# Patient Record
Sex: MALE | Race: WHITE | NOT HISPANIC OR LATINO
[De-identification: names, ages, dates, MRNs, and addresses within clinical notes are randomized per-mention and may not be internally consistent; named-entity substitution may affect disease eponyms.]

---

## 2018-06-14 PROBLEM — Z00.00 ENCOUNTER FOR PREVENTIVE HEALTH EXAMINATION: Status: ACTIVE | Noted: 2018-06-14

## 2018-06-15 ENCOUNTER — APPOINTMENT (OUTPATIENT)
Dept: CT IMAGING | Facility: CLINIC | Age: 53
End: 2018-06-15
Payer: COMMERCIAL

## 2018-06-15 ENCOUNTER — OUTPATIENT (OUTPATIENT)
Dept: OUTPATIENT SERVICES | Facility: HOSPITAL | Age: 53
LOS: 1 days | End: 2018-06-15

## 2018-06-15 PROCEDURE — 75571 CT HRT W/O DYE W/CA TEST: CPT | Mod: 26

## 2018-06-20 ENCOUNTER — TRANSCRIPTION ENCOUNTER (OUTPATIENT)
Age: 53
End: 2018-06-20

## 2018-06-29 ENCOUNTER — OUTPATIENT (OUTPATIENT)
Dept: OUTPATIENT SERVICES | Facility: HOSPITAL | Age: 53
LOS: 1 days | End: 2018-06-29

## 2018-06-29 ENCOUNTER — APPOINTMENT (OUTPATIENT)
Dept: ULTRASOUND IMAGING | Facility: CLINIC | Age: 53
End: 2018-06-29
Payer: COMMERCIAL

## 2018-06-29 PROCEDURE — 76775 US EXAM ABDO BACK WALL LIM: CPT | Mod: 26

## 2019-09-20 ENCOUNTER — TRANSCRIPTION ENCOUNTER (OUTPATIENT)
Age: 54
End: 2019-09-20

## 2019-09-25 ENCOUNTER — APPOINTMENT (OUTPATIENT)
Dept: PULMONOLOGY | Facility: CLINIC | Age: 54
End: 2019-09-25

## 2021-10-01 ENCOUNTER — APPOINTMENT (OUTPATIENT)
Dept: UROLOGY | Facility: CLINIC | Age: 56
End: 2021-10-01
Payer: COMMERCIAL

## 2021-10-01 ENCOUNTER — NON-APPOINTMENT (OUTPATIENT)
Age: 56
End: 2021-10-01

## 2021-10-01 VITALS
BODY MASS INDEX: 26.9 KG/M2 | TEMPERATURE: 97.8 F | HEIGHT: 73 IN | WEIGHT: 203 LBS | DIASTOLIC BLOOD PRESSURE: 92 MMHG | HEART RATE: 99 BPM | SYSTOLIC BLOOD PRESSURE: 145 MMHG

## 2021-10-01 DIAGNOSIS — Z87.891 PERSONAL HISTORY OF NICOTINE DEPENDENCE: ICD-10-CM

## 2021-10-01 PROCEDURE — 99204 OFFICE O/P NEW MOD 45 MIN: CPT

## 2021-10-01 RX ORDER — ATORVASTATIN CALCIUM 20 MG/1
20 TABLET, FILM COATED ORAL
Refills: 0 | Status: ACTIVE | COMMUNITY

## 2021-10-01 RX ORDER — OXYBUTYNIN CHLORIDE 10 MG/1
10 TABLET, EXTENDED RELEASE ORAL
Qty: 30 | Refills: 11 | Status: ACTIVE | COMMUNITY
Start: 2021-10-01 | End: 1900-01-01

## 2021-10-01 RX ORDER — OLMESARTAN MEDOXOMIL AND HYDROCHLOROTHIAZIDE 40; 12.5 MG/1; MG/1
40-12.5 TABLET ORAL
Refills: 0 | Status: ACTIVE | COMMUNITY

## 2021-10-01 RX ORDER — VALACYCLOVIR HYDROCHLORIDE 1 G/1
1 TABLET, FILM COATED ORAL
Refills: 0 | Status: ACTIVE | COMMUNITY

## 2021-10-01 RX ORDER — ESCITALOPRAM OXALATE 20 MG/1
20 TABLET ORAL
Refills: 0 | Status: ACTIVE | COMMUNITY

## 2021-10-01 NOTE — HISTORY OF PRESENT ILLNESS
[FreeTextEntry1] : 56M HTN high cholesterol\par 1 year history increasing urinary urgency\par occasional urge incontinence\par no pads\par good FOS\par nocturia x 1\par +frequency\par no hematuria\par occasional double voiding\par +increasing post void dribbling\par +caffeine - 2-3 coffee/tea per day \par alcohol 4 drinks pre day\par no family history prostate kidney bladder cancer\par

## 2021-10-01 NOTE — LETTER BODY
[Dear  ___] : Dear  [unfilled], [FreeTextEntry2] : Jed Walker M.D.\[address]\[place] [id number] [FreeTextEntry1] : I had the pleasure of seeing your patient,  RAFFAELE QUEZADA, a 56 year old man, in the office in consultation today. Please see the attached note for full details.\par \par Thank you very much for allowing me to participate in the care of this patient. If you have any questions please feel free to call me at any time. \par \par \par Sincerely yours,\par \par \par \par Edil De León MD, JAMIL\par Director, Male Fertility and Microsurgery\par  of Urology\par [facility]\par

## 2021-10-01 NOTE — PHYSICAL EXAM
[Urethral Meatus] : meatus normal [Penis Abnormality] : normal circumcised penis [Urinary Bladder Findings] : the bladder was normal on palpation [Scrotum] : the scrotum was normal [Epididymis] : the epididymides were normal [Testes Tenderness] : no tenderness of the testes [Testes Mass (___cm)] : there were no testicular masses [Prostate Tenderness] : the prostate was not tender [No Prostate Nodules] : no prostate nodules [Prostate Size ___ gm] : prostate size [unfilled] gm

## 2021-10-04 ENCOUNTER — NON-APPOINTMENT (OUTPATIENT)
Age: 56
End: 2021-10-04

## 2021-10-04 DIAGNOSIS — R31.29 OTHER MICROSCOPIC HEMATURIA: ICD-10-CM

## 2021-10-04 LAB
APPEARANCE: CLEAR
BACTERIA UR CULT: NORMAL
BACTERIA: NEGATIVE
BILIRUBIN URINE: NEGATIVE
BLOOD URINE: NEGATIVE
COLOR: YELLOW
GLUCOSE QUALITATIVE U: NEGATIVE
HYALINE CASTS: 0 /LPF
KETONES URINE: NORMAL
LEUKOCYTE ESTERASE URINE: NEGATIVE
MICROSCOPIC-UA: NORMAL
NITRITE URINE: NEGATIVE
PH URINE: 6
PROTEIN URINE: ABNORMAL
PSA SERPL-MCNC: 1.27 NG/ML
RED BLOOD CELLS URINE: 3 /HPF
SPECIFIC GRAVITY URINE: 1.03
SQUAMOUS EPITHELIAL CELLS: 0 /HPF
UROBILINOGEN URINE: NORMAL
WHITE BLOOD CELLS URINE: 1 /HPF

## 2021-10-05 ENCOUNTER — NON-APPOINTMENT (OUTPATIENT)
Age: 56
End: 2021-10-05

## 2021-10-22 ENCOUNTER — APPOINTMENT (OUTPATIENT)
Dept: UROLOGY | Facility: CLINIC | Age: 56
End: 2021-10-22
Payer: COMMERCIAL

## 2021-10-22 VITALS — TEMPERATURE: 98 F | DIASTOLIC BLOOD PRESSURE: 79 MMHG | HEART RATE: 66 BPM | SYSTOLIC BLOOD PRESSURE: 133 MMHG

## 2021-10-22 DIAGNOSIS — N32.81 OVERACTIVE BLADDER: ICD-10-CM

## 2021-10-22 PROCEDURE — 76775 US EXAM ABDO BACK WALL LIM: CPT

## 2021-10-22 PROCEDURE — 52000 CYSTOURETHROSCOPY: CPT

## 2021-11-16 ENCOUNTER — APPOINTMENT (OUTPATIENT)
Dept: UROLOGY | Facility: CLINIC | Age: 56
End: 2021-11-16

## 2021-11-22 ENCOUNTER — OUTPATIENT (OUTPATIENT)
Dept: OUTPATIENT SERVICES | Facility: HOSPITAL | Age: 56
LOS: 1 days | End: 2021-11-22
Payer: COMMERCIAL

## 2021-11-22 ENCOUNTER — APPOINTMENT (OUTPATIENT)
Dept: CT IMAGING | Facility: HOSPITAL | Age: 56
End: 2021-11-22
Payer: COMMERCIAL

## 2021-11-22 ENCOUNTER — RESULT REVIEW (OUTPATIENT)
Age: 56
End: 2021-11-22

## 2021-11-22 PROCEDURE — 74178 CT ABD&PLV WO CNTR FLWD CNTR: CPT

## 2021-11-22 PROCEDURE — 74178 CT ABD&PLV WO CNTR FLWD CNTR: CPT | Mod: 26

## 2021-12-01 ENCOUNTER — NON-APPOINTMENT (OUTPATIENT)
Age: 56
End: 2021-12-01

## 2021-12-02 ENCOUNTER — NON-APPOINTMENT (OUTPATIENT)
Age: 56
End: 2021-12-02

## 2021-12-08 ENCOUNTER — NON-APPOINTMENT (OUTPATIENT)
Age: 56
End: 2021-12-08

## 2021-12-09 ENCOUNTER — NON-APPOINTMENT (OUTPATIENT)
Age: 56
End: 2021-12-09

## 2022-11-29 NOTE — ASSESSMENT
[FreeTextEntry1] : LUTS ?BPH more likely OAB\par trial oxybutynin 10\par UA UCX\par PSA\par decrease EtOH and caffeine\par f/u 6 weeks\par  Azathioprine Pregnancy And Lactation Text: This medication is Pregnancy Category D and isn't considered safe during pregnancy. It is unknown if this medication is excreted in breast milk.

## 2024-11-04 NOTE — REASON FOR VISIT
[Thin] : thin [Short For Stated Age] : short for stated age [icteric] : anicteric [Normal Oropharynx] : the oropharynx was normal [CTAB] : lungs clear to auscultation bilaterally [Respiratory Distress] : no respiratory distress  [Soft] : soft  [Distended] : non distended [Initial Visit ___] : [unfilled] is here today for an initial visit  for [unfilled] [Tender] : non tender [Rash] : no rash [Jaundice] : no jaundice

## 2025-01-03 ENCOUNTER — NON-APPOINTMENT (OUTPATIENT)
Age: 60
End: 2025-01-03

## 2025-01-03 ENCOUNTER — APPOINTMENT (OUTPATIENT)
Dept: OPHTHALMOLOGY | Facility: CLINIC | Age: 60
End: 2025-01-03
Payer: COMMERCIAL

## 2025-01-03 PROCEDURE — 92004 COMPRE OPH EXAM NEW PT 1/>: CPT

## 2025-01-03 PROCEDURE — 92136 OPHTHALMIC BIOMETRY: CPT

## 2025-01-03 PROCEDURE — 92133 CPTRZD OPH DX IMG PST SGM ON: CPT

## 2025-01-09 ENCOUNTER — APPOINTMENT (OUTPATIENT)
Dept: OPHTHALMOLOGY | Facility: CLINIC | Age: 60
End: 2025-01-09
Payer: COMMERCIAL

## 2025-01-09 ENCOUNTER — NON-APPOINTMENT (OUTPATIENT)
Age: 60
End: 2025-01-09

## 2025-01-09 PROCEDURE — 92083 EXTENDED VISUAL FIELD XM: CPT

## 2025-01-09 PROCEDURE — 92002 INTRM OPH EXAM NEW PATIENT: CPT

## 2025-02-11 ENCOUNTER — NON-APPOINTMENT (OUTPATIENT)
Age: 60
End: 2025-02-11

## 2025-02-11 ENCOUNTER — APPOINTMENT (OUTPATIENT)
Dept: OPHTHALMOLOGY | Facility: CLINIC | Age: 60
End: 2025-02-11
Payer: COMMERCIAL

## 2025-02-11 PROCEDURE — 92012 INTRM OPH EXAM EST PATIENT: CPT

## 2025-02-11 PROCEDURE — 92134 CPTRZ OPH DX IMG PST SGM RTA: CPT

## 2025-02-20 ENCOUNTER — APPOINTMENT (OUTPATIENT)
Dept: OPHTHALMOLOGY | Facility: CLINIC | Age: 60
End: 2025-02-20
Payer: COMMERCIAL

## 2025-02-20 ENCOUNTER — NON-APPOINTMENT (OUTPATIENT)
Age: 60
End: 2025-02-20

## 2025-02-20 PROCEDURE — 92020 GONIOSCOPY: CPT

## 2025-02-20 PROCEDURE — 92083 EXTENDED VISUAL FIELD XM: CPT

## 2025-02-20 PROCEDURE — 76514 ECHO EXAM OF EYE THICKNESS: CPT

## 2025-02-20 PROCEDURE — 92012 INTRM OPH EXAM EST PATIENT: CPT

## 2025-02-20 PROCEDURE — 92133 CPTRZD OPH DX IMG PST SGM ON: CPT

## 2025-04-29 ENCOUNTER — NON-APPOINTMENT (OUTPATIENT)
Age: 60
End: 2025-04-29

## 2025-04-29 ENCOUNTER — APPOINTMENT (OUTPATIENT)
Dept: OPHTHALMOLOGY | Facility: CLINIC | Age: 60
End: 2025-04-29
Payer: COMMERCIAL

## 2025-04-29 PROCEDURE — 92136 OPHTHALMIC BIOMETRY: CPT

## 2025-04-29 PROCEDURE — 92012 INTRM OPH EXAM EST PATIENT: CPT

## 2025-05-22 NOTE — OPERATIVE REPORT - OPERATIVE RPOSRT DETAILS
DATE OF SURGERY:May 28, 2025    Surgeon:  Claude Boothe    PRE-OP DIAGNOSIS:Nuclear Cataract Left Eye    POST-OP DIAGNOSIS: Same    ANESTHESIA: MAC    PROCEDURE: Cataract extraction with intraocular lens implant left eye    SPECIMEN/TISSUE REMOVED: None    ESTIMATED BLOOD LOSS: < 1mL    COMPLICATIONS: None    The patient was brought to the operating room.  A drop of topical anesthetic was placed in the eye. The patient was prepped and draped in the usual sterile fashion, including Betadine drops in the eye. A lid speculum was placed between the lids of the left eye. A paracentesis was made inferior. Intracameral preservative free lidocaine and epinephrine was instilled and the anterior chamber was filled with air, trypan blue, and viscoelastic.. A clear cornea temporal incision was created using a 2.4mm keratome. A continuous curvilinear capsulorhexis was started with a cystotome and completed with capsulorhexis forceps. The lens was hydrodissected with BSS. The lens was then phacoemulsified using a divide and conquer technique. Residual cortical material was removed using automated irrigation and aspiration. The capsular bag was reformed using a viscoelastic. A DCB00 18.50 lens was inserted into the bag. Symmetric capsular bag fixation was confirmed. The remaining viscoelastic was removed with automated irrigation and aspiration. The wounds were hydrated and checked to be water tight. The lid speculum was removed. Antibiotic/steroid ointment was instilled in the eye and a shield was placed over the eye. The patient left the OR in stable condition having tolerated the procedure well. Claude JERRY was present throughout the case. DATE OF SURGERY:May 28, 2025    Surgeon:  Claude Boothe    PRE-OP DIAGNOSIS:Nuclear Cataract Left Eye, Astigmatism    POST-OP DIAGNOSIS: Same    ANESTHESIA: MAC    PROCEDURE: Cataract extraction with intraocular lens implant left eye    SPECIMEN/TISSUE REMOVED: None    ESTIMATED BLOOD LOSS: < 1mL    COMPLICATIONS: None    The patient was brought to the operating room.  A drop of topical anesthetic was placed in the eye. The 90 and 180 degree meridians were marked with the patient sitting up. The patient was prepped and draped in the usual sterile fashion, including Betadine drops in the eye. A lid speculum was placed between the lids of the left eye. A paracentesis was made inferior. The 92 degree meridian was marked.  Intracameral preservative free lidocaine and epinephrine was instilled and the anterior chamber was filled with air, trypan blue, and viscoelastic.. A clear cornea temporal incision was created using a 2.4mm keratome. A continuous curvilinear capsulorhexis was started with a cystotome and completed with capsulorhexis forceps. The lens was hydrodissected with BSS. The lens was then phacoemulsified using a divide and conquer technique. Residual cortical material was removed using automated irrigation and aspiration. The capsular bag was reformed using a viscoelastic. A [id number] 18.50 lens was inserted into the bag. Symmetric capsular bag fixation was confirmed and the lens was rotated into the 92 degree meridian. The remaining viscoelastic was removed with automated irrigation and aspiration. The wounds were hydrated and checked to be water tight. The lid speculum was removed. Antibiotic/steroid ointment was instilled in the eye and a shield was placed over the eye. The patient left the OR in stable condition having tolerated the procedure well. IClaude was present throughout the case.   DATE OF SURGERY:May 28, 2025    Surgeon:  Claude Boothe    PRE-OP DIAGNOSIS:Nuclear Cataract Left Eye,    POST-OP DIAGNOSIS: Same    ANESTHESIA: MAC    PROCEDURE: Cataract extraction with intraocular lens implant left eye    SPECIMEN/TISSUE REMOVED: None    ESTIMATED BLOOD LOSS: < 1mL    COMPLICATIONS: None    The patient was brought to the operating room.  A drop of topical anesthetic was placed in the eye. The patient was prepped and draped in the usual sterile fashion, including Betadine drops in the eye. A lid speculum was placed between the lids of the left eye. A paracentesis was made inferior. The 92 degree meridian was marked.  Intracameral preservative free lidocaine and epinephrine was instilled and the anterior chamber was filled with air, trypan blue, and viscoelastic.. A clear cornea temporal incision was created using a 2.4mm keratome. A continuous curvilinear capsulorhexis was started with a cystotome and completed with capsulorhexis forceps. The lens was hydrodissected with BSS. The lens was then phacoemulsified using a divide and conquer technique. Residual cortical material was removed using automated irrigation and aspiration. The capsular bag was reformed using a viscoelastic. A DCB00 18.50 lens was inserted into the bag. Symmetric capsular bag fixation was confirmed and the remaining viscoelastic was removed with automated irrigation and aspiration. The wounds were hydrated and checked to be water tight. The lid speculum was removed. Antibiotic/steroid ointment was instilled in the eye and a shield was placed over the eye. The patient left the OR in stable condition having tolerated the procedure well. IClaude was present throughout the case.   DATE OF SURGERY:May 28, 2025    Surgeon:  Claude Boothe    PRE-OP DIAGNOSIS:Nuclear Cataract Left Eye,    POST-OP DIAGNOSIS: Same    ANESTHESIA: MAC    PROCEDURE: Cataract extraction with intraocular lens implant left eye    SPECIMEN/TISSUE REMOVED: None    ESTIMATED BLOOD LOSS: < 1mL    COMPLICATIONS: None    The patient was brought to the operating room.  A drop of topical anesthetic was placed in the eye. The patient was prepped and draped in the usual sterile fashion, including Betadine drops in the eye. A lid speculum was placed between the lids of the left eye. A paracentesis was made inferior. The 92 degree meridian was marked.  Intracameral preservative free lidocaine and epinephrine was instilled and the anterior chamber was filled with air, trypan blue, and viscoelastic.. A clear cornea temporal incision was created using a 2.4mm keratome. A continuous curvilinear capsulorhexis was started with a cystotome and completed with capsulorhexis forceps. The lens was hydrodissected with BSS. The lens was then phacoemulsified using a divide and conquer technique. Residual cortical material was removed using automated irrigation and aspiration. The capsular bag was reformed using a viscoelastic. A DCB00 19.00 lens was inserted into the bag. Symmetric capsular bag fixation was confirmed and the remaining viscoelastic was removed with automated irrigation and aspiration. The wounds were hydrated and checked to be water tight. The lid speculum was removed. Antibiotic/steroid ointment was instilled in the eye and a shield was placed over the eye. The patient left the OR in stable condition having tolerated the procedure well. IClaude was present throughout the case.

## 2025-05-27 RX ORDER — ATORVASTATIN CALCIUM 80 MG/1
1 TABLET, FILM COATED ORAL
Refills: 0 | DISCHARGE

## 2025-05-27 RX ORDER — OLMESARTAN MEDOXOMIL 5 MG/1
1 TABLET, FILM COATED ORAL
Refills: 0 | DISCHARGE

## 2025-05-27 RX ORDER — HYDROCHLOROTHIAZIDE 50 MG/1
1 TABLET ORAL
Refills: 0 | DISCHARGE

## 2025-05-27 RX ORDER — LORATADINE 5 MG/5ML
1 SOLUTION ORAL
Refills: 0 | DISCHARGE

## 2025-05-27 RX ORDER — ESCITALOPRAM OXALATE 20 MG/1
1 TABLET ORAL
Refills: 0 | DISCHARGE

## 2025-05-27 RX ORDER — METOPROLOL SUCCINATE 50 MG/1
1 TABLET, EXTENDED RELEASE ORAL
Refills: 0 | DISCHARGE

## 2025-05-27 RX ORDER — DOXYCYCLINE HYCLATE 100 MG
1 TABLET ORAL
Refills: 0 | DISCHARGE

## 2025-05-27 NOTE — ASU PATIENT PROFILE, ADULT - NS PREOP UNDERSTANDS INFO
no solids after 12mn, water allowed up to 5am, bring ID and insurance card, no valuables or jewelry, dress comfortable. no alcohol today, address and call back number given/yes

## 2025-05-27 NOTE — ASU PATIENT PROFILE, ADULT - NSICDXPASTSURGICALHX_GEN_ALL_CORE_FT
PAST SURGICAL HISTORY:  H/O clavicle fracture 6 surgical procedures    H/O shoulder surgery left x3    Pain management     S/P foot surgery right second toe    S/P hip arthroscopy left    S/P tonsillectomy      PAST SURGICAL HISTORY:  H/O clavicle fracture 6 surgical procedures    H/O shoulder surgery left x3    S/P foot surgery right second toe    S/P hip arthroscopy left    S/P tonsillectomy

## 2025-05-27 NOTE — ASU PATIENT PROFILE, ADULT - NSICDXPASTMEDICALHX_GEN_ALL_CORE_FT
167.64 PAST MEDICAL HISTORY:  Anxiety     HLD (hyperlipidemia)     HTN (hypertension)      PAST MEDICAL HISTORY:  Anxiety     H/O atrial tachycardia     HLD (hyperlipidemia)     HTN (hypertension)

## 2025-05-27 NOTE — ASU PATIENT PROFILE, ADULT - NSTOBACCO TYPE_GEN_A_CORE_RD
Cigarettes Excisional Biopsy Additional Text (Leave Blank If You Do Not Want): The margin was drawn around the clinically apparent lesion. An elliptical shape was then drawn on the skin incorporating the lesion and margins.  Incisions were then made along these lines to the appropriate tissue plane and the lesion was extirpated.

## 2025-05-28 ENCOUNTER — NON-APPOINTMENT (OUTPATIENT)
Age: 60
End: 2025-05-28

## 2025-05-28 ENCOUNTER — OUTPATIENT (OUTPATIENT)
Dept: OUTPATIENT SERVICES | Facility: HOSPITAL | Age: 60
LOS: 1 days | Discharge: HOME | End: 2025-05-28
Payer: COMMERCIAL

## 2025-05-28 ENCOUNTER — APPOINTMENT (OUTPATIENT)
Dept: OPHTHALMOLOGY | Facility: AMBULATORY SURGERY CENTER | Age: 60
End: 2025-05-28

## 2025-05-28 VITALS
RESPIRATION RATE: 16 BRPM | HEART RATE: 68 BPM | SYSTOLIC BLOOD PRESSURE: 118 MMHG | DIASTOLIC BLOOD PRESSURE: 64 MMHG | TEMPERATURE: 97 F | OXYGEN SATURATION: 95 %

## 2025-05-28 VITALS
WEIGHT: 201.72 LBS | HEIGHT: 73 IN | RESPIRATION RATE: 17 BRPM | HEART RATE: 66 BPM | TEMPERATURE: 98 F | OXYGEN SATURATION: 98 % | SYSTOLIC BLOOD PRESSURE: 130 MMHG | DIASTOLIC BLOOD PRESSURE: 81 MMHG

## 2025-05-28 DIAGNOSIS — Z98.890 OTHER SPECIFIED POSTPROCEDURAL STATES: Chronic | ICD-10-CM

## 2025-05-28 DIAGNOSIS — R52 PAIN, UNSPECIFIED: Chronic | ICD-10-CM

## 2025-05-28 DIAGNOSIS — Z87.81 PERSONAL HISTORY OF (HEALED) TRAUMATIC FRACTURE: Chronic | ICD-10-CM

## 2025-05-28 DIAGNOSIS — Z90.89 ACQUIRED ABSENCE OF OTHER ORGANS: Chronic | ICD-10-CM

## 2025-05-28 PROCEDURE — 66984 XCAPSL CTRC RMVL W/O ECP: CPT | Mod: LT

## 2025-05-28 DEVICE — LENS IOL TECNIS SMPLCTY 1PC CLR MONO DCB0000 19.0D
Type: IMPLANTABLE DEVICE | Site: LEFT | Status: NON-FUNCTIONAL
Removed: 2025-05-28

## 2025-05-28 RX ORDER — SODIUM CHLORIDE 9 G/1000ML
1000 INJECTION, SOLUTION INTRAVENOUS
Refills: 0 | Status: DISCONTINUED | OUTPATIENT
Start: 2025-05-28 | End: 2025-05-28

## 2025-05-28 RX ORDER — KETOROLAC TROMETHAMINE 5 MG/ML
1 SOLUTION/ DROPS OPHTHALMIC
Refills: 0 | Status: COMPLETED | OUTPATIENT
Start: 2025-05-28 | End: 2025-05-28

## 2025-05-28 RX ORDER — ONDANSETRON HCL/PF 4 MG/2 ML
4 VIAL (ML) INJECTION ONCE
Refills: 0 | Status: DISCONTINUED | OUTPATIENT
Start: 2025-05-28 | End: 2025-05-28

## 2025-05-28 RX ORDER — TETRACAINE HYDROCHLORIDE 5 MG/ML
1 SOLUTION OPHTHALMIC ONCE
Refills: 0 | Status: COMPLETED | OUTPATIENT
Start: 2025-05-28 | End: 2025-05-28

## 2025-05-28 RX ORDER — TROPICAMIDE
1 POWDER (GRAM) MISCELLANEOUS
Refills: 0 | Status: COMPLETED | OUTPATIENT
Start: 2025-05-28 | End: 2025-05-28

## 2025-05-28 RX ORDER — ACETAMINOPHEN 500 MG/5ML
650 LIQUID (ML) ORAL ONCE
Refills: 0 | Status: DISCONTINUED | OUTPATIENT
Start: 2025-05-28 | End: 2025-05-28

## 2025-05-28 RX ORDER — OFLOXACIN 3 MG/ML
1 SOLUTION OPHTHALMIC
Refills: 0 | Status: COMPLETED | OUTPATIENT
Start: 2025-05-28 | End: 2025-05-28

## 2025-05-28 RX ORDER — PHENYLEPHRINE HYDROCHLORIDE 25 MG/ML
1 SOLUTION OPHTHALMIC
Refills: 0 | Status: COMPLETED | OUTPATIENT
Start: 2025-05-28 | End: 2025-05-28

## 2025-05-28 RX ADMIN — TETRACAINE HYDROCHLORIDE 1 DROP(S): 5 SOLUTION OPHTHALMIC at 07:11

## 2025-05-28 RX ADMIN — Medication 1 DROP(S): at 07:11

## 2025-05-28 RX ADMIN — OFLOXACIN 1 DROP(S): 3 SOLUTION OPHTHALMIC at 07:11

## 2025-05-28 RX ADMIN — PHENYLEPHRINE HYDROCHLORIDE 1 DROP(S): 25 SOLUTION OPHTHALMIC at 07:11

## 2025-05-28 RX ADMIN — PHENYLEPHRINE HYDROCHLORIDE 1 DROP(S): 25 SOLUTION OPHTHALMIC at 07:21

## 2025-05-28 RX ADMIN — OFLOXACIN 1 DROP(S): 3 SOLUTION OPHTHALMIC at 07:21

## 2025-05-28 RX ADMIN — KETOROLAC TROMETHAMINE 1 DROP(S): 5 SOLUTION/ DROPS OPHTHALMIC at 07:16

## 2025-05-28 RX ADMIN — KETOROLAC TROMETHAMINE 1 DROP(S): 5 SOLUTION/ DROPS OPHTHALMIC at 07:21

## 2025-05-28 RX ADMIN — PHENYLEPHRINE HYDROCHLORIDE 1 DROP(S): 25 SOLUTION OPHTHALMIC at 07:16

## 2025-05-28 RX ADMIN — Medication 1 DROP(S): at 07:21

## 2025-05-28 RX ADMIN — Medication 1 DROP(S): at 07:16

## 2025-05-28 RX ADMIN — KETOROLAC TROMETHAMINE 1 DROP(S): 5 SOLUTION/ DROPS OPHTHALMIC at 07:11

## 2025-05-28 RX ADMIN — OFLOXACIN 1 DROP(S): 3 SOLUTION OPHTHALMIC at 07:16

## 2025-05-29 ENCOUNTER — NON-APPOINTMENT (OUTPATIENT)
Age: 60
End: 2025-05-29

## 2025-05-29 ENCOUNTER — APPOINTMENT (OUTPATIENT)
Dept: OPHTHALMOLOGY | Facility: CLINIC | Age: 60
End: 2025-05-29
Payer: COMMERCIAL

## 2025-05-29 PROCEDURE — 99024 POSTOP FOLLOW-UP VISIT: CPT

## 2025-06-04 ENCOUNTER — APPOINTMENT (OUTPATIENT)
Dept: OPHTHALMOLOGY | Facility: CLINIC | Age: 60
End: 2025-06-04
Payer: COMMERCIAL

## 2025-06-04 ENCOUNTER — NON-APPOINTMENT (OUTPATIENT)
Age: 60
End: 2025-06-04

## 2025-06-04 PROBLEM — Z86.79 PERSONAL HISTORY OF OTHER DISEASES OF THE CIRCULATORY SYSTEM: Chronic | Status: ACTIVE | Noted: 2025-05-28

## 2025-06-04 PROCEDURE — 99024 POSTOP FOLLOW-UP VISIT: CPT

## 2025-06-06 PROBLEM — I10 ESSENTIAL (PRIMARY) HYPERTENSION: Chronic | Status: ACTIVE | Noted: 2025-05-27

## 2025-06-06 PROBLEM — F41.9 ANXIETY DISORDER, UNSPECIFIED: Chronic | Status: ACTIVE | Noted: 2025-05-27

## 2025-06-06 PROBLEM — E78.5 HYPERLIPIDEMIA, UNSPECIFIED: Chronic | Status: ACTIVE | Noted: 2025-05-27

## 2025-06-09 NOTE — ASU PATIENT PROFILE, ADULT - NSICDXPASTSURGICALHX_GEN_ALL_CORE_FT
PAST SURGICAL HISTORY:  H/O clavicle fracture 6 surgical procedures    H/O shoulder surgery left x3    S/P cataract extraction left    S/P foot surgery right second toe    S/P hip arthroscopy left    S/P tonsillectomy

## 2025-06-09 NOTE — ASU PATIENT PROFILE, ADULT - NSICDXPASTMEDICALHX_GEN_ALL_CORE_FT
PAST MEDICAL HISTORY:  Anxiety     H/O atrial tachycardia     HLD (hyperlipidemia)     HTN (hypertension)

## 2025-06-09 NOTE — ASU PATIENT PROFILE, ADULT - FALL HARM RISK - UNIVERSAL INTERVENTIONS
Bed in lowest position, wheels locked, appropriate side rails in place/Call bell, personal items and telephone in reach/Instruct patient to call for assistance before getting out of bed or chair/Non-slip footwear when patient is out of bed/[place]ed to call system/Physically safe environment - no spills, clutter or unnecessary equipment/Purposeful Proactive Rounding/Room/bathroom lighting operational, light cord in reach

## 2025-06-09 NOTE — OPERATIVE REPORT - OPERATIVE RPOSRT DETAILS
DATE OF SURGERY:June 11, 2025    Surgeon:  Claude Boothe    PRE-OP DIAGNOSIS:  Nuclear Cataract Right Eye    POST-OP DIAGNOSIS: Same    ANESTHESIA: MAC    PROCEDURE: Cataract extraction with intraocular lens implant Right eye    SPECIMEN/TISSUE REMOVED: None    ESTIMATED BLOOD LOSS: < 1mL    COMPLICATIONS: None    The patient was brought to the operating room.  A drop of topical anesthetic was placed in the eye. The patient was prepped and draped in the usual sterile fashion, including Betadine drops in the eye. A lid speculum was placed between the lids of the right eye. A paracentesis was made superiorly. Intracameral preservative free lidocaine and epinephrine was instilled and the anterior chamber was filled with air, trypan blue, and viscoelastic. A clear cornea temporal incision was created using a 2.4mm keratome. A continuous curvilinear capsulorhexis was started with a cystotome and completed with capsulorhexis forceps. The lens was hydrodissected with BSS. The lens was then phacoemulsified using a divide and conquer technique. Residual cortical material was removed using automated irrigation and aspiration. The capsular bag was reformed using a viscoelastic. A DCB00 19.50 lens was inserted into the bag. Symmetric capsular bag fixation was confirmed. The remaining viscoelastic was removed with automated irrigation and aspiration. The wounds were hydrated and checked to be water tight. The lid speculum was removed. Antibiotic/steroid ointment was instilled in the eye and a shield was placed over the eye. The patient left the OR in stable condition having tolerated the procedure well. Claude JERRY was present throughout the case. DATE OF SURGERY:June 11, 2025    Surgeon:  Claude Boothe    PRE-OP DIAGNOSIS:  Nuclear Cataract Right Eye    POST-OP DIAGNOSIS: Same    ANESTHESIA: MAC    PROCEDURE: Cataract extraction with intraocular lens implant Right eye    SPECIMEN/TISSUE REMOVED: None    ESTIMATED BLOOD LOSS: < 1mL    COMPLICATIONS: None    The patient was brought to the operating room.  A drop of topical anesthetic was placed in the eye. The patient was prepped and draped in the usual sterile fashion, including Betadine drops in the eye. A lid speculum was placed between the lids of the right eye. A paracentesis was made superiorly. Intracameral preservative free lidocaine and epinephrine was instilled and the anterior chamber was filled with air, trypan blue, and viscoelastic. A clear cornea temporal incision was created using a 2.4mm keratome. A continuous curvilinear capsulorhexis was started with a cystotome and completed with capsulorhexis forceps. The lens was hydrodissected with BSS. The lens was then phacoemulsified using a divide and conquer technique. Residual cortical material was removed using automated irrigation and aspiration. The capsular bag was reformed using a viscoelastic. A DCB00 20.00 lens was inserted into the bag. Symmetric capsular bag fixation was confirmed. The remaining viscoelastic was removed with automated irrigation and aspiration. The wounds were hydrated and checked to be water tight. The lid speculum was removed. Antibiotic/steroid ointment was instilled in the eye and a shield was placed over the eye. The patient left the OR in stable condition having tolerated the procedure well. Claude JERRY was present throughout the case.

## 2025-06-09 NOTE — ASU PATIENT PROFILE, ADULT - NS PREOP UNDERSTANDS INFO
No solid food/dairy/candy/gum after 11:30pm Tuesday; water allowed before 04:30am Wednesday; patient reminded to come with photo ID/insurance/credit card; dress in comfortable clothes; no jewelries/contact lens/valuable; no smoking/alcohol drinking/recreational drug use Tuesday; escort to have photo ID; address and callback number was given/yes

## 2025-06-10 ENCOUNTER — NON-APPOINTMENT (OUTPATIENT)
Age: 60
End: 2025-06-10

## 2025-06-11 ENCOUNTER — OUTPATIENT (OUTPATIENT)
Dept: OUTPATIENT SERVICES | Facility: HOSPITAL | Age: 60
LOS: 1 days | Discharge: HOME | End: 2025-06-11
Payer: COMMERCIAL

## 2025-06-11 ENCOUNTER — APPOINTMENT (OUTPATIENT)
Dept: OPHTHALMOLOGY | Facility: AMBULATORY SURGERY CENTER | Age: 60
End: 2025-06-11

## 2025-06-11 ENCOUNTER — NON-APPOINTMENT (OUTPATIENT)
Age: 60
End: 2025-06-11

## 2025-06-11 VITALS
WEIGHT: 202.83 LBS | OXYGEN SATURATION: 99 % | TEMPERATURE: 98 F | DIASTOLIC BLOOD PRESSURE: 78 MMHG | SYSTOLIC BLOOD PRESSURE: 127 MMHG | HEART RATE: 55 BPM | RESPIRATION RATE: 16 BRPM | HEIGHT: 73 IN

## 2025-06-11 VITALS
OXYGEN SATURATION: 96 % | TEMPERATURE: 97 F | HEART RATE: 63 BPM | RESPIRATION RATE: 16 BRPM | DIASTOLIC BLOOD PRESSURE: 62 MMHG | SYSTOLIC BLOOD PRESSURE: 118 MMHG

## 2025-06-11 DIAGNOSIS — Z98.890 OTHER SPECIFIED POSTPROCEDURAL STATES: Chronic | ICD-10-CM

## 2025-06-11 DIAGNOSIS — Z98.49 CATARACT EXTRACTION STATUS, UNSPECIFIED EYE: Chronic | ICD-10-CM

## 2025-06-11 DIAGNOSIS — Z90.89 ACQUIRED ABSENCE OF OTHER ORGANS: Chronic | ICD-10-CM

## 2025-06-11 DIAGNOSIS — Z87.81 PERSONAL HISTORY OF (HEALED) TRAUMATIC FRACTURE: Chronic | ICD-10-CM

## 2025-06-11 PROCEDURE — 92136 OPHTHALMIC BIOMETRY: CPT | Mod: 26,RT

## 2025-06-11 PROCEDURE — 66984 XCAPSL CTRC RMVL W/O ECP: CPT | Mod: 79,RT

## 2025-06-11 DEVICE — LENS IOL TECNIS SMPLCTY 1PC CLR MONO DCB0000 20.0D
Type: IMPLANTABLE DEVICE | Site: RIGHT | Status: NON-FUNCTIONAL
Removed: 2025-06-11

## 2025-06-11 RX ORDER — METOPROLOL SUCCINATE 50 MG/1
1 TABLET, EXTENDED RELEASE ORAL
Refills: 0 | DISCHARGE

## 2025-06-11 RX ORDER — TROPICAMIDE
1 POWDER (GRAM) MISCELLANEOUS
Refills: 0 | Status: COMPLETED | OUTPATIENT
Start: 2025-06-11 | End: 2025-06-11

## 2025-06-11 RX ORDER — DOXYCYCLINE HYCLATE 100 MG
1 TABLET ORAL
Refills: 0 | DISCHARGE

## 2025-06-11 RX ORDER — TETRACAINE HYDROCHLORIDE 5 MG/ML
1 SOLUTION OPHTHALMIC ONCE
Refills: 0 | Status: COMPLETED | OUTPATIENT
Start: 2025-06-11 | End: 2025-06-11

## 2025-06-11 RX ORDER — OFLOXACIN 3 MG/ML
1 SOLUTION OPHTHALMIC
Refills: 0 | Status: COMPLETED | OUTPATIENT
Start: 2025-06-11 | End: 2025-06-11

## 2025-06-11 RX ORDER — ACETAMINOPHEN 500 MG/5ML
975 LIQUID (ML) ORAL ONCE
Refills: 0 | Status: DISCONTINUED | OUTPATIENT
Start: 2025-06-11 | End: 2025-06-11

## 2025-06-11 RX ORDER — OLMESARTAN MEDOXOMIL 5 MG/1
1 TABLET, FILM COATED ORAL
Refills: 0 | DISCHARGE

## 2025-06-11 RX ORDER — PHENYLEPHRINE HYDROCHLORIDE 25 MG/ML
1 SOLUTION OPHTHALMIC
Refills: 0 | Status: COMPLETED | OUTPATIENT
Start: 2025-06-11 | End: 2025-06-11

## 2025-06-11 RX ORDER — HYDROCHLOROTHIAZIDE 50 MG/1
1 TABLET ORAL
Refills: 0 | DISCHARGE

## 2025-06-11 RX ORDER — KETOROLAC TROMETHAMINE 5 MG/ML
1 SOLUTION/ DROPS OPHTHALMIC
Refills: 0 | Status: COMPLETED | OUTPATIENT
Start: 2025-06-11 | End: 2025-06-11

## 2025-06-11 RX ORDER — ATORVASTATIN CALCIUM 80 MG/1
1 TABLET, FILM COATED ORAL
Refills: 0 | DISCHARGE

## 2025-06-11 RX ORDER — ONDANSETRON HCL/PF 4 MG/2 ML
4 VIAL (ML) INJECTION ONCE
Refills: 0 | Status: DISCONTINUED | OUTPATIENT
Start: 2025-06-11 | End: 2025-06-11

## 2025-06-11 RX ORDER — LORATADINE 5 MG/5ML
1 SOLUTION ORAL
Refills: 0 | DISCHARGE

## 2025-06-11 RX ORDER — ESCITALOPRAM OXALATE 20 MG/1
1 TABLET ORAL
Refills: 0 | DISCHARGE

## 2025-06-11 RX ADMIN — PHENYLEPHRINE HYDROCHLORIDE 1 DROP(S): 25 SOLUTION OPHTHALMIC at 06:52

## 2025-06-11 RX ADMIN — OFLOXACIN 1 DROP(S): 3 SOLUTION OPHTHALMIC at 06:47

## 2025-06-11 RX ADMIN — KETOROLAC TROMETHAMINE 1 DROP(S): 5 SOLUTION/ DROPS OPHTHALMIC at 06:42

## 2025-06-11 RX ADMIN — Medication 1 DROP(S): at 06:52

## 2025-06-11 RX ADMIN — PHENYLEPHRINE HYDROCHLORIDE 1 DROP(S): 25 SOLUTION OPHTHALMIC at 06:42

## 2025-06-11 RX ADMIN — KETOROLAC TROMETHAMINE 1 DROP(S): 5 SOLUTION/ DROPS OPHTHALMIC at 06:47

## 2025-06-11 RX ADMIN — OFLOXACIN 1 DROP(S): 3 SOLUTION OPHTHALMIC at 06:42

## 2025-06-11 RX ADMIN — PHENYLEPHRINE HYDROCHLORIDE 1 DROP(S): 25 SOLUTION OPHTHALMIC at 06:47

## 2025-06-11 RX ADMIN — KETOROLAC TROMETHAMINE 1 DROP(S): 5 SOLUTION/ DROPS OPHTHALMIC at 06:52

## 2025-06-11 RX ADMIN — Medication 1 DROP(S): at 06:42

## 2025-06-11 RX ADMIN — Medication 1 DROP(S): at 06:47

## 2025-06-11 RX ADMIN — TETRACAINE HYDROCHLORIDE 1 DROP(S): 5 SOLUTION OPHTHALMIC at 06:42

## 2025-06-11 RX ADMIN — OFLOXACIN 1 DROP(S): 3 SOLUTION OPHTHALMIC at 06:52

## 2025-06-12 ENCOUNTER — APPOINTMENT (OUTPATIENT)
Dept: OPHTHALMOLOGY | Facility: CLINIC | Age: 60
End: 2025-06-12
Payer: COMMERCIAL

## 2025-06-12 ENCOUNTER — NON-APPOINTMENT (OUTPATIENT)
Age: 60
End: 2025-06-12

## 2025-06-12 PROCEDURE — 99024 POSTOP FOLLOW-UP VISIT: CPT

## 2025-06-18 ENCOUNTER — NON-APPOINTMENT (OUTPATIENT)
Age: 60
End: 2025-06-18

## 2025-06-18 ENCOUNTER — APPOINTMENT (OUTPATIENT)
Dept: OPHTHALMOLOGY | Facility: CLINIC | Age: 60
End: 2025-06-18
Payer: COMMERCIAL

## 2025-06-18 PROCEDURE — 99024 POSTOP FOLLOW-UP VISIT: CPT

## 2025-07-03 ENCOUNTER — APPOINTMENT (OUTPATIENT)
Dept: OPHTHALMOLOGY | Facility: CLINIC | Age: 60
End: 2025-07-03
Payer: COMMERCIAL

## 2025-07-03 ENCOUNTER — NON-APPOINTMENT (OUTPATIENT)
Age: 60
End: 2025-07-03

## 2025-07-03 PROCEDURE — 99024 POSTOP FOLLOW-UP VISIT: CPT

## 2025-08-05 ENCOUNTER — APPOINTMENT (OUTPATIENT)
Dept: OPHTHALMOLOGY | Facility: CLINIC | Age: 60
End: 2025-08-05
Payer: COMMERCIAL

## 2025-08-05 ENCOUNTER — NON-APPOINTMENT (OUTPATIENT)
Age: 60
End: 2025-08-05

## 2025-08-05 PROCEDURE — 99024 POSTOP FOLLOW-UP VISIT: CPT

## 2025-08-06 ENCOUNTER — NON-APPOINTMENT (OUTPATIENT)
Age: 60
End: 2025-08-06

## 2025-08-07 ENCOUNTER — APPOINTMENT (OUTPATIENT)
Dept: OPHTHALMOLOGY | Facility: CLINIC | Age: 60
End: 2025-08-07
Payer: COMMERCIAL

## 2025-08-07 ENCOUNTER — NON-APPOINTMENT (OUTPATIENT)
Age: 60
End: 2025-08-07

## 2025-08-07 PROCEDURE — 92083 EXTENDED VISUAL FIELD XM: CPT

## 2025-08-07 PROCEDURE — 92133 CPTRZD OPH DX IMG PST SGM ON: CPT

## 2025-08-07 PROCEDURE — 92012 INTRM OPH EXAM EST PATIENT: CPT | Mod: 24

## (undated) DEVICE — SOL SYR OPHTHALMIC VISION BLUE TRYPAN BLUE 0.06% 0.5 ML

## (undated) DEVICE — CARTRIDGE PLATINUM

## (undated) DEVICE — KNIFE ALCON PARACENTESIS CLEARCUT SIDEPORT 1MM (YELLOW)

## (undated) DEVICE — SOL IRR BAL SALT 500ML

## (undated) DEVICE — GLV 6.5 PROTEXIS (WHITE)

## (undated) DEVICE — GOWN SLEEVES

## (undated) DEVICE — NDL HYPO NONSAFE 30G X 0.5" (BEIGE)

## (undated) DEVICE — TRANSFORMER INTREPID I/A 0.3MM

## (undated) DEVICE — CANNULA IRR ANT CHAMBER 30G

## (undated) DEVICE — CYSTOTOME IRRIG 27G 5X8IN 10SP

## (undated) DEVICE — PACK CENTURION 2.4MM

## (undated) DEVICE — DRAPE MICROSCOPE KNOB COVER SMALL (2 PCS)

## (undated) DEVICE — SUT NYLON 10-0 12" CU-5

## (undated) DEVICE — NDL PERIBULBAR ATKINSON 25G X 7/8"

## (undated) DEVICE — Device

## (undated) DEVICE — PACK ANTERIOR SEGMENT

## (undated) DEVICE — TIP OZIL 12 DEGREE MINI FLARE